# Patient Record
Sex: FEMALE | Race: WHITE | URBAN - METROPOLITAN AREA
[De-identification: names, ages, dates, MRNs, and addresses within clinical notes are randomized per-mention and may not be internally consistent; named-entity substitution may affect disease eponyms.]

---

## 2024-02-27 ENCOUNTER — TELEPHONE (OUTPATIENT)
Dept: INTERNAL MEDICINE CLINIC | Age: 29
End: 2024-02-27

## 2024-02-27 NOTE — TELEPHONE ENCOUNTER
----- Message from Kenia Quinteros sent at 2/27/2024  3:35 PM EST -----  Subject: Appointment Request    Reason for Call: New Patient/New to Provider Appointment needed: New   Patient Request Appointment    QUESTIONS    Reason for appointment request? No appointments available during search     Additional Information for Provider? IMPT? Patient would like to establish   care with Dr. Pete Nuno. Both her mom (Dixie Robertson) and her grandmother   (Virginie Robertson) are current patients of hers. Please call to   discuss/schedule asap.  ---------------------------------------------------------------------------  --------------  CALL BACK INFO  [phone]; OK to leave message on voicemail  ---------------------------------------------------------------------------  --------------  SCRIPT ANSWERS

## 2024-02-28 NOTE — TELEPHONE ENCOUNTER
Sure, will be happy to see her.  Can help her schedule next available extended visit for new patient, please explain likely booking ahead a few months out for new patient appointment.

## 2024-06-12 ENCOUNTER — TELEPHONE (OUTPATIENT)
Dept: INTERNAL MEDICINE CLINIC | Age: 29
End: 2024-06-12

## 2024-06-12 NOTE — TELEPHONE ENCOUNTER
Pt is returning a call to Nette about her appointment nd coming in sooner.    Please call and advise [phone]

## 2024-06-25 ENCOUNTER — TELEPHONE (OUTPATIENT)
Dept: INTERNAL MEDICINE CLINIC | Age: 29
End: 2024-06-25

## 2024-06-25 NOTE — TELEPHONE ENCOUNTER
Pt needs to cancel her appt with Dr Nuno for her Physical that is scheduled  07-.  She will be out of town and returning on 07-.  She mentioned a phone call received from the office about moving her appt up.      Please call [employer] at: [phone]

## 2025-02-24 LAB — PAP SMEAR, EXTERNAL: NORMAL

## 2025-07-08 ENCOUNTER — OFFICE VISIT (OUTPATIENT)
Dept: INTERNAL MEDICINE CLINIC | Age: 30
End: 2025-07-08
Payer: COMMERCIAL

## 2025-07-08 VITALS
BODY MASS INDEX: 19.7 KG/M2 | OXYGEN SATURATION: 98 % | HEIGHT: 66 IN | TEMPERATURE: 97.7 F | HEART RATE: 79 BPM | WEIGHT: 122.6 LBS | SYSTOLIC BLOOD PRESSURE: 100 MMHG | DIASTOLIC BLOOD PRESSURE: 70 MMHG

## 2025-07-08 DIAGNOSIS — F32.A DEPRESSION, UNSPECIFIED DEPRESSION TYPE: ICD-10-CM

## 2025-07-08 DIAGNOSIS — F90.9 ATTENTION DEFICIT HYPERACTIVITY DISORDER (ADHD), UNSPECIFIED ADHD TYPE: ICD-10-CM

## 2025-07-08 DIAGNOSIS — F41.9 ANXIETY: ICD-10-CM

## 2025-07-08 DIAGNOSIS — J30.89 NON-SEASONAL ALLERGIC RHINITIS, UNSPECIFIED TRIGGER: ICD-10-CM

## 2025-07-08 DIAGNOSIS — Z00.00 ANNUAL PHYSICAL EXAM: Primary | ICD-10-CM

## 2025-07-08 DIAGNOSIS — L70.0 ACNE VULGARIS: ICD-10-CM

## 2025-07-08 DIAGNOSIS — L40.4 GUTTATE PSORIASIS: ICD-10-CM

## 2025-07-08 PROCEDURE — 99385 PREV VISIT NEW AGE 18-39: CPT | Performed by: INTERNAL MEDICINE

## 2025-07-08 RX ORDER — SERTRALINE HYDROCHLORIDE 100 MG/1
100 TABLET, FILM COATED ORAL DAILY
COMMUNITY
Start: 2025-06-17

## 2025-07-08 RX ORDER — CETIRIZINE HYDROCHLORIDE 10 MG/1
10 TABLET ORAL DAILY
COMMUNITY

## 2025-07-08 RX ORDER — ATOMOXETINE 40 MG/1
40 CAPSULE ORAL DAILY
COMMUNITY
Start: 2025-06-17

## 2025-07-08 SDOH — ECONOMIC STABILITY: FOOD INSECURITY: WITHIN THE PAST 12 MONTHS, YOU WORRIED THAT YOUR FOOD WOULD RUN OUT BEFORE YOU GOT MONEY TO BUY MORE.: NEVER TRUE

## 2025-07-08 SDOH — ECONOMIC STABILITY: FOOD INSECURITY: WITHIN THE PAST 12 MONTHS, THE FOOD YOU BOUGHT JUST DIDN'T LAST AND YOU DIDN'T HAVE MONEY TO GET MORE.: NEVER TRUE

## 2025-07-08 ASSESSMENT — PATIENT HEALTH QUESTIONNAIRE - PHQ9
SUM OF ALL RESPONSES TO PHQ QUESTIONS 1-9: 0
SUM OF ALL RESPONSES TO PHQ QUESTIONS 1-9: 0
1. LITTLE INTEREST OR PLEASURE IN DOING THINGS: NOT AT ALL
SUM OF ALL RESPONSES TO PHQ QUESTIONS 1-9: 0
2. FEELING DOWN, DEPRESSED OR HOPELESS: NOT AT ALL
SUM OF ALL RESPONSES TO PHQ QUESTIONS 1-9: 0

## 2025-07-08 ASSESSMENT — ENCOUNTER SYMPTOMS
BLOOD IN STOOL: 0
ABDOMINAL PAIN: 0
COLOR CHANGE: 0
SHORTNESS OF BREATH: 0
DIARRHEA: 0
COUGH: 0
NAUSEA: 0
EYE REDNESS: 0

## 2025-07-08 NOTE — ASSESSMENT & PLAN NOTE
-manages by derm, but needs to establish with a new provider.  Plan to try and get her in with the new dermatologist which will be starting with [facility] this summer  -treated with steroid cream

## 2025-07-08 NOTE — ASSESSMENT & PLAN NOTE
-Managed by psych  -On Strattera 40 mg (dose recently increased 2 weeks ago)  -Stimulants made her feel \"manic\"

## 2025-07-08 NOTE — ASSESSMENT & PLAN NOTE
Here to establish care and for annual physical exam, overall doing well from a clinical standpoint.  As for health maintenance:  -cervical cancer screen: Negative Pap smear 2/25  -breast cancer screen: No indication for early screening at this point, plan to start at age 40  -colon cancer screen: No indication for early screening at this point, plan to start at age 45  -BP within goal  -BMI 19, no indication for lipid screening at this point, will get fasting glucose  -HIV neg 2025  -negative depression and DV screen  -Advised to eat a healthy, well-balanced diet  -Advised to exercise at least 30min/day 5x/week for heart and bone health  -Check skin regularly for new moles or changes in moles. wear sunscreen at least SPF 30 and don't forget to reapply every 3-4 hours or if you are in the water  -Follow up with dentist at least twice/year.  -Follow up with eye doctor at least once/year.  -Calcium goal is 1200 mg a day ideally from diet (dairy, green leafy vegetables, nuts) .

## 2025-07-08 NOTE — PROGRESS NOTES
mouth daily      cetirizine (ZYRTEC) 10 MG tablet Take 1 tablet by mouth daily      sertraline (ZOLOFT) 100 MG tablet Take 1 tablet by mouth daily      Ibuprofen (ADVIL PO) Take by mouth as needed       No current facility-administered medications on file prior to visit.        No Known Allergies  Past Medical History:   Diagnosis Date    Acne     Elevated LFTs 1/9/2013    Hyperlipidemia 1/9/2013     Patient Active Problem List   Diagnosis    Acne    Dysmenorrhea    Depression    Annual physical exam    Non-seasonal allergic rhinitis    Guttate psoriasis    ADHD     Past Surgical History:   Procedure Laterality Date    TONSILLECTOMY  2002     Social History     Socioeconomic History    Marital status: Single     Spouse name: Not on file    Number of children: Not on file    Years of education: Not on file    Highest education level: Not on file   Occupational History    Not on file   Tobacco Use    Smoking status: Never    Smokeless tobacco: Never   Substance and Sexual Activity    Alcohol use: Yes     Comment: occassionally    Drug use: No     Comment: CBD edibles    Sexual activity: Yes     Partners: Male   Other Topics Concern    Not on file   Social History Narrative    Not on file     Social Drivers of Health     Financial Resource Strain: Not on file   Food Insecurity: No Food Insecurity (7/8/2025)    Hunger Vital Sign     Worried About Running Out of Food in the Last Year: Never true     Ran Out of Food in the Last Year: Never true   Transportation Needs: No Transportation Needs (7/8/2025)    PRAPARE - Transportation     Lack of Transportation (Medical): No     Lack of Transportation (Non-Medical): No   Physical Activity: Not on file   Stress: Not on file   Social Connections: Not on file   Intimate Partner Violence: Unknown (12/20/2017)    Received from [facility]    Intimate Partner Violence     If you are in a relationship, do you feel safe in that relationship?: Yes

## 2025-07-08 NOTE — ASSESSMENT & PLAN NOTE
Reports significant allergies for which she takes Zyrtec daily.  -Would like to try Flonase nasal spray to try and spare daily antihistamine use due to potential associated risk of cognitive decline if able.  -She would like to get allergy testing, will refer to allergy.  Might benefit from shots

## 2025-07-08 NOTE — PATIENT INSTRUCTIONS
-You can try Flonase nasal spray   -Call us back at the end of August to ask for a referral to our new dermatologist     -eat a healthy, well-balanced diet  -exercise at least 30min/day 5x/week for heart and bone health  -Check skin regularly for new moles or changes in moles. wear sunscreen at least SPF 30 and don't forget to reapply every 3-4 hours or if you are in the water  -Follow up with dentist at least twice/year.  -Follow up with eye doctor at least once/year.

## 2025-07-08 NOTE — ASSESSMENT & PLAN NOTE
Managed by psychiatry, sees psych NP Natalee Mehta.  Has been diagnosed with OCD (germs, nail and hair picking), depression and ADHD  -Recently increased Zoloft to 100 mg (2 weeks ago, too early to evaluate for response)  -Sees her therapist weekly which has been very helpful  -Reportedly multiple medications attempted in the past including prozac, lexapro, likely more.  Reportedly had [facility] testing.  She will reach out to her original psychiatrist to send me record  -Denies manic symptoms today, denies SI